# Patient Record
Sex: FEMALE | Race: WHITE | Employment: FULL TIME | URBAN - NONMETROPOLITAN AREA
[De-identification: names, ages, dates, MRNs, and addresses within clinical notes are randomized per-mention and may not be internally consistent; named-entity substitution may affect disease eponyms.]

---
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
## 2023-01-19 ENCOUNTER — OFFICE VISIT (OUTPATIENT)
Dept: FAMILY MEDICINE CLINIC | Age: 23
End: 2023-01-19
Payer: COMMERCIAL

## 2023-01-19 VITALS
OXYGEN SATURATION: 99 % | DIASTOLIC BLOOD PRESSURE: 74 MMHG | HEART RATE: 98 BPM | WEIGHT: 155 LBS | SYSTOLIC BLOOD PRESSURE: 126 MMHG | HEIGHT: 63 IN | BODY MASS INDEX: 27.46 KG/M2

## 2023-01-19 DIAGNOSIS — D24.2 FIBROADENOMA OF BREAST, LEFT: Primary | ICD-10-CM

## 2023-01-19 DIAGNOSIS — F41.8 DEPRESSION WITH ANXIETY: ICD-10-CM

## 2023-01-19 DIAGNOSIS — D24.1 FIBROADENOMA OF RIGHT BREAST: ICD-10-CM

## 2023-01-19 PROCEDURE — 99204 OFFICE O/P NEW MOD 45 MIN: CPT | Performed by: NURSE PRACTITIONER

## 2023-01-19 RX ORDER — ESCITALOPRAM OXALATE 5 MG/1
5 TABLET ORAL DAILY
Qty: 30 TABLET | Refills: 0 | Status: SHIPPED | OUTPATIENT
Start: 2023-01-19

## 2023-01-19 SDOH — ECONOMIC STABILITY: FOOD INSECURITY: WITHIN THE PAST 12 MONTHS, THE FOOD YOU BOUGHT JUST DIDN'T LAST AND YOU DIDN'T HAVE MONEY TO GET MORE.: NEVER TRUE

## 2023-01-19 SDOH — ECONOMIC STABILITY: FOOD INSECURITY: WITHIN THE PAST 12 MONTHS, YOU WORRIED THAT YOUR FOOD WOULD RUN OUT BEFORE YOU GOT MONEY TO BUY MORE.: NEVER TRUE

## 2023-01-19 ASSESSMENT — PATIENT HEALTH QUESTIONNAIRE - PHQ9
2. FEELING DOWN, DEPRESSED OR HOPELESS: 0
SUM OF ALL RESPONSES TO PHQ QUESTIONS 1-9: 0
SUM OF ALL RESPONSES TO PHQ9 QUESTIONS 1 & 2: 0
SUM OF ALL RESPONSES TO PHQ QUESTIONS 1-9: 0
1. LITTLE INTEREST OR PLEASURE IN DOING THINGS: 0

## 2023-01-19 ASSESSMENT — SOCIAL DETERMINANTS OF HEALTH (SDOH): HOW HARD IS IT FOR YOU TO PAY FOR THE VERY BASICS LIKE FOOD, HOUSING, MEDICAL CARE, AND HEATING?: NOT HARD AT ALL

## 2023-01-19 NOTE — PROGRESS NOTES
Subjective  Chief Complaint   Patient presents with    New Patient     Pt would like to establish care, pt declined flu vax. Pt would like referral to [address] [place] [address]  would like to discuss possible solutions for anxiety. HPI    Pt here to establish care. No previous PCP. Anxiety-feeling anxious for a couple years, comes and goes in waves, feels she is ready to take something for anxiety now. Has never been on medication in the past. Is also scheduling with counseling at [facility]. Does notice some depression also. Has multiple family members on lexapro who do well with that so is interested in trying that. Has h/o fibroadenoma in breasts, has not followed up with Dr. Aruna Feliz, would like referral now to get follow up. Past Medical History:   Diagnosis Date    Fibroadenoma of both breasts      Patient Active Problem List    Diagnosis Date Noted    Fibroadenoma of breast, left 11/01/2021    Left breast mass 10/14/2021     Past Surgical History:   Procedure Laterality Date    BREAST SURGERY Left 10/14/2021    LEFT BREAST EXCISIONAL BIOPSY performed by Louann Gamble MD at [address] History   Problem Relation Age of Onset    Cancer Other         throat cancer     Social History     Socioeconomic History    Marital status: Single     Spouse name: None    Number of children: None    Years of education: None    Highest education level: None   Tobacco Use    Smoking status: Never    Smokeless tobacco: Never   Vaping Use    Vaping Use: Never used   Substance and Sexual Activity    Alcohol use: Not Currently    Drug use: Never     Social Determinants of Health     Financial Resource Strain: Low Risk     Difficulty of Paying Living Expenses: Not hard at all   Food Insecurity: No Food Insecurity    Worried About Running Out of Food in the Last Year: Never true    Ran Out of Food in the Last Year: Never true     No current outpatient medications on file prior to visit.      No current facility-administered medications on file prior to visit. No Known Allergies    Review of Systems   Constitutional:  Negative for activity change, appetite change, chills, diaphoresis, fatigue and fever. HENT:  Negative for congestion, ear pain, hearing loss and trouble swallowing. Eyes:  Negative for pain and visual disturbance. Respiratory:  Negative for cough, chest tightness and shortness of breath. Cardiovascular:  Negative for chest pain, palpitations and leg swelling. Gastrointestinal:  Negative for abdominal pain, constipation and diarrhea. Endocrine: Negative for polydipsia, polyphagia and polyuria. Genitourinary:  Negative for difficulty urinating and dysuria. Musculoskeletal:  Negative for arthralgias and back pain. Skin:  Negative for color change and rash. Neurological:  Negative for dizziness and light-headedness. Psychiatric/Behavioral:  Positive for dysphoric mood. The patient is nervous/anxious. Objective  Vitals:    01/19/23 1510   BP: 126/74   Site: Right Upper Arm   Position: Sitting   Cuff Size: Medium Adult   Pulse: 98   SpO2: 99%   Weight: 155 lb (70.3 kg)   Height: 5' 3\" (1.6 m)     Physical Exam  Vitals reviewed. Constitutional:       General: She is not in acute distress. Appearance: Normal appearance. She is well-developed and normal weight. She is not ill-appearing, toxic-appearing or diaphoretic. HENT:      Head: Normocephalic and atraumatic. Right Ear: Hearing, tympanic membrane, ear canal and external ear normal. There is no impacted cerumen. Left Ear: Hearing, tympanic membrane, ear canal and external ear normal. There is no impacted cerumen. Nose: Nose normal. No congestion or rhinorrhea. Mouth/Throat:      Mouth: Mucous membranes are moist.      Pharynx: Oropharynx is clear. No oropharyngeal exudate or posterior oropharyngeal erythema. Eyes:      General:         Right eye: No discharge. Left eye: No discharge. Conjunctiva/sclera: Conjunctivae normal.      Pupils: Pupils are equal, round, and reactive to light. Neck:      Thyroid: No thyromegaly. Cardiovascular:      Rate and Rhythm: Normal rate and regular rhythm. Pulses: Normal pulses. Heart sounds: Normal heart sounds. No murmur heard. Pulmonary:      Effort: Pulmonary effort is normal. No respiratory distress. Breath sounds: Normal breath sounds. No stridor. No wheezing, rhonchi or rales. Chest:      Chest wall: No tenderness. Musculoskeletal:         General: Normal range of motion. Cervical back: Neck supple. No tenderness. Right lower leg: No edema. Left lower leg: No edema. Lymphadenopathy:      Cervical: No cervical adenopathy. Skin:     General: Skin is warm and dry. Capillary Refill: Capillary refill takes less than 2 seconds. Coloration: Skin is not jaundiced or pale. Findings: No bruising, erythema, lesion or rash. Neurological:      General: No focal deficit present. Mental Status: She is alert and oriented to person, place, and time. Mental status is at baseline. Cranial Nerves: No cranial nerve deficit. Coordination: Coordination normal.      Gait: Gait normal.   Psychiatric:         Mood and Affect: Mood normal.         Speech: Speech normal.         Behavior: Behavior normal.         Thought Content: Thought content normal.         Judgment: Judgment normal.       Assessment& Plan     Diagnosis Orders   1. Fibroadenoma of breast, left  Ambar Wright MD, Breast Surgery, [place]      2. Fibroadenoma of right breast  Ambar Wright MD, Breast Surgery, [place]      3. Depression with anxiety  escitalopram (LEXAPRO) 5 MG tablet        Referral to Dr. Isak Brown. Discussed starting low dose anti-depressant for current symptoms. Discussed that the first 2 weeks are to monitor for side effects.  At the 2 week follow up, the dose will be increased if no significant side effects are reported. Discussed possible side effects with most common side effects being GI related; also discussed BBW of suicidiality. The patient verbalizes understanding. Discussed that it can take up to 6 weeks on a therapeutic dose of medication to reach peak effect. Side effects, adverse effects of the medication prescribed today, as well as treatment plan/ rationale and result expectations have been discussed with the patient who expresses understanding and desires to proceed. Close follow up to evaluate treatment results and for coordination of care. I have reviewed the patient's medical history in detail and updated the computerized patient record. As always, patient is advised that if symptoms worsen in any way they will proceed to the nearest emergency room. Orders Placed This Encounter   Procedures    Cecelia Jenkins MD, Breast Surgery, [facility]     Referral Priority:   Routine     Referral Type:   Eval and Treat     Referral Reason:   Specialty Services Required     Referred to Provider:   Floridalma Vargas MD     Requested Specialty:   General Surgery     Number of Visits Requested:   1       Orders Placed This Encounter   Medications    escitalopram (LEXAPRO) 5 MG tablet     Sig: Take 1 tablet by mouth daily     Dispense:  30 tablet     Refill:  0       There are no discontinued medications. Return in about 2 weeks (around 2/2/2023) for depression/anxiety.     Rex Orlando, ANDREAS - CNP

## 2023-01-20 ASSESSMENT — ENCOUNTER SYMPTOMS
COLOR CHANGE: 0
CHEST TIGHTNESS: 0
BACK PAIN: 0
DIARRHEA: 0
ABDOMINAL PAIN: 0
EYE PAIN: 0
SHORTNESS OF BREATH: 0
CONSTIPATION: 0
COUGH: 0
TROUBLE SWALLOWING: 0

## 2023-01-31 ENCOUNTER — TELEPHONE (OUTPATIENT)
Dept: OBGYN CLINIC | Age: 23
End: 2023-01-31

## 2023-01-31 NOTE — TELEPHONE ENCOUNTER
Pt was last seen on 11-1-21 for post op excisional Bx. Per last note pt was suppose to follow up with 6 month breast US and FU with Glenn Núñez, pt did not have either apt. Pt was advised by her PCP to follow back up with dr Glenn Núñez. Please advise how to schedule.

## 2023-01-31 NOTE — TELEPHONE ENCOUNTER
I scheduled the patient for a follow up appointment. The patient is aware of the date/time/location of the appointment.

## 2023-02-02 ENCOUNTER — OFFICE VISIT (OUTPATIENT)
Dept: FAMILY MEDICINE CLINIC | Age: 23
End: 2023-02-02
Payer: COMMERCIAL

## 2023-02-02 VITALS
TEMPERATURE: 98.6 F | HEART RATE: 71 BPM | OXYGEN SATURATION: 99 % | DIASTOLIC BLOOD PRESSURE: 60 MMHG | SYSTOLIC BLOOD PRESSURE: 104 MMHG | WEIGHT: 149.2 LBS | BODY MASS INDEX: 26.44 KG/M2 | HEIGHT: 63 IN

## 2023-02-02 DIAGNOSIS — F41.8 DEPRESSION WITH ANXIETY: Primary | ICD-10-CM

## 2023-02-02 DIAGNOSIS — N92.0 MENSTRUAL SPOTTING: ICD-10-CM

## 2023-02-02 PROCEDURE — 99214 OFFICE O/P EST MOD 30 MIN: CPT | Performed by: NURSE PRACTITIONER

## 2023-02-02 RX ORDER — ESCITALOPRAM OXALATE 10 MG/1
10 TABLET ORAL DAILY
Qty: 30 TABLET | Refills: 3 | Status: SHIPPED | OUTPATIENT
Start: 2023-02-02

## 2023-02-02 SDOH — ECONOMIC STABILITY: HOUSING INSECURITY
IN THE LAST 12 MONTHS, WAS THERE A TIME WHEN YOU DID NOT HAVE A STEADY PLACE TO SLEEP OR SLEPT IN A SHELTER (INCLUDING NOW)?: NO

## 2023-02-02 SDOH — ECONOMIC STABILITY: FOOD INSECURITY: WITHIN THE PAST 12 MONTHS, THE FOOD YOU BOUGHT JUST DIDN'T LAST AND YOU DIDN'T HAVE MONEY TO GET MORE.: NEVER TRUE

## 2023-02-02 SDOH — ECONOMIC STABILITY: FOOD INSECURITY: WITHIN THE PAST 12 MONTHS, YOU WORRIED THAT YOUR FOOD WOULD RUN OUT BEFORE YOU GOT MONEY TO BUY MORE.: NEVER TRUE

## 2023-02-02 SDOH — ECONOMIC STABILITY: INCOME INSECURITY: HOW HARD IS IT FOR YOU TO PAY FOR THE VERY BASICS LIKE FOOD, HOUSING, MEDICAL CARE, AND HEATING?: NOT HARD AT ALL

## 2023-02-02 ASSESSMENT — ENCOUNTER SYMPTOMS
COUGH: 0
EYE PAIN: 0
DIARRHEA: 0
TROUBLE SWALLOWING: 0
CHEST TIGHTNESS: 0
COLOR CHANGE: 0
SHORTNESS OF BREATH: 0
BACK PAIN: 0
ABDOMINAL PAIN: 0
CONSTIPATION: 0

## 2023-02-02 NOTE — PROGRESS NOTES
Subjective  Chief Complaint   Patient presents with    2 Week Follow-Up    Anxiety    Medication Check     Pt states that she likes that meds. Script is helping. Would like to talk about increasing dose to see if it can get better. Flu Vaccine     declined       HPI    F/u on anxiety. Feels she really likes the lexapro. Feels her thinking is less \"obsessive\". Takes it at 8 pm nightly. No side effects from medication. Would like to increase dose to see how she does with that as she feels her symptoms could be better. Having some menstrual spotting which is unusual for her. Has nexplanon but is on last year with it. Asking to see gyn to discuss further.     Past Medical History:   Diagnosis Date    Fibroadenoma of both breasts      Patient Active Problem List    Diagnosis Date Noted    Fibroadenoma of breast, left 11/01/2021    Left breast mass 10/14/2021     Past Surgical History:   Procedure Laterality Date    BREAST SURGERY Left 10/14/2021    LEFT BREAST EXCISIONAL BIOPSY performed by Lakesha Avila MD at [address] History   Problem Relation Age of Onset    Cancer Other         throat cancer     Social History     Socioeconomic History    Marital status: Single     Spouse name: None    Number of children: None    Years of education: None    Highest education level: None   Tobacco Use    Smoking status: Never    Smokeless tobacco: Never   Vaping Use    Vaping Use: Never used   Substance and Sexual Activity    Alcohol use: Not Currently    Drug use: Never     Social Determinants of Health     Financial Resource Strain: Low Risk     Difficulty of Paying Living Expenses: Not hard at all   Food Insecurity: No Food Insecurity    Worried About Running Out of Food in the Last Year: Never true    Ran Out of Food in the Last Year: Never true   Transportation Needs: Unknown    Lack of Transportation (Non-Medical): No   Housing Stability: Unknown    Unstable Housing in the Last Year: No     No current outpatient medications on file prior to visit. No current facility-administered medications on file prior to visit. No Known Allergies    Review of Systems   Constitutional:  Negative for activity change, appetite change, chills, diaphoresis, fatigue and fever. HENT:  Negative for congestion, ear pain, hearing loss and trouble swallowing. Eyes:  Negative for pain and visual disturbance. Respiratory:  Negative for cough, chest tightness and shortness of breath. Cardiovascular:  Negative for chest pain, palpitations and leg swelling. Gastrointestinal:  Negative for abdominal pain, constipation and diarrhea. Endocrine: Negative for polydipsia, polyphagia and polyuria. Genitourinary:  Positive for menstrual problem. Negative for difficulty urinating and dysuria. Musculoskeletal:  Negative for arthralgias and back pain. Skin:  Negative for color change and rash. Neurological:  Negative for dizziness and light-headedness. Psychiatric/Behavioral:  Negative for dysphoric mood. The patient is not nervous/anxious. Objective  Vitals:    02/02/23 1207   BP: 104/60   Pulse: 71   Temp: 98.6 °F (37 °C)   SpO2: 99%   Weight: 149 lb 3.2 oz (67.7 kg)   Height: 5' 3\" (1.6 m)     Physical Exam  Constitutional:       General: She is not in acute distress. Appearance: Normal appearance. She is normal weight. She is not ill-appearing, toxic-appearing or diaphoretic. HENT:      Head: Normocephalic and atraumatic. Right Ear: External ear normal.      Left Ear: External ear normal.      Nose: Nose normal. No congestion or rhinorrhea. Eyes:      Extraocular Movements: Extraocular movements intact. Conjunctiva/sclera: Conjunctivae normal.      Pupils: Pupils are equal, round, and reactive to light. Cardiovascular:      Rate and Rhythm: Normal rate and regular rhythm. Pulses: Normal pulses. Heart sounds: Normal heart sounds. No murmur heard.   Pulmonary:      Effort: Pulmonary effort is normal. No respiratory distress. Breath sounds: Normal breath sounds. No stridor. No wheezing, rhonchi or rales. Chest:      Chest wall: No tenderness. Musculoskeletal:         General: Normal range of motion. Cervical back: Normal range of motion and neck supple. No tenderness. Right lower leg: No edema. Left lower leg: No edema. Lymphadenopathy:      Cervical: No cervical adenopathy. Skin:     General: Skin is warm. Capillary Refill: Capillary refill takes less than 2 seconds. Coloration: Skin is not jaundiced. Findings: No erythema or lesion. Neurological:      General: No focal deficit present. Mental Status: She is alert and oriented to person, place, and time. Mental status is at baseline. Cranial Nerves: No cranial nerve deficit. Coordination: Coordination normal.      Gait: Gait normal.   Psychiatric:         Mood and Affect: Mood normal.         Behavior: Behavior normal.         Thought Content: Thought content normal.         Judgment: Judgment normal.       Assessment& Plan     Diagnosis Orders   1. Depression with anxiety  escitalopram (LEXAPRO) 10 MG tablet      2. Menstrual spotting  TARYN Donald CNM, OB-GYN, [place]        Increase lexapro to 10 mg daily. Referral to gyn. F/u in 4 weeks or sooner PRN. Side effects, adverse effects of the medication prescribed today, as well as treatment plan/ rationale and result expectations have been discussed with the patient who expresses understanding and desires to proceed. Close follow up to evaluate treatment results and for coordination of care. I have reviewed the patient's medical history in detail and updated the computerized patient record. As always, patient is advised that if symptoms worsen in any way they will proceed to the nearest emergency room.        Orders Placed This Encounter   Procedures    Ambar Boone CNM, [facility] Referral Priority:   Routine     Referral Type:   Eval and Treat     Referral Reason:   Specialty Services Required     Referred to Provider:   ANDREAS Miguel CNM     Requested Specialty:   Certified Nurse Midwife     Number of Visits Requested:   1       Orders Placed This Encounter   Medications    escitalopram (LEXAPRO) 10 MG tablet     Sig: Take 1 tablet by mouth daily     Dispense:  30 tablet     Refill:  3       Medications Discontinued During This Encounter   Medication Reason    escitalopram (LEXAPRO) 5 MG tablet DOSE ADJUSTMENT       Return in about 4 weeks (around 3/2/2023) for depression/anxiety.     ANDREAS Domínguez - CNP

## 2023-02-18 ENCOUNTER — OFFICE VISIT (OUTPATIENT)
Dept: OBGYN CLINIC | Age: 23
End: 2023-02-18
Payer: COMMERCIAL

## 2023-02-18 VITALS
HEART RATE: 91 BPM | BODY MASS INDEX: 27.1 KG/M2 | DIASTOLIC BLOOD PRESSURE: 72 MMHG | WEIGHT: 153 LBS | SYSTOLIC BLOOD PRESSURE: 118 MMHG

## 2023-02-18 DIAGNOSIS — N92.1 BREAKTHROUGH BLEEDING ON NEXPLANON: ICD-10-CM

## 2023-02-18 DIAGNOSIS — N94.6 DYSMENORRHEA: Primary | ICD-10-CM

## 2023-02-18 DIAGNOSIS — R39.15 URGENCY OF URINATION: ICD-10-CM

## 2023-02-18 DIAGNOSIS — Z97.5 BREAKTHROUGH BLEEDING ON NEXPLANON: ICD-10-CM

## 2023-02-18 DIAGNOSIS — Z20.2 POSSIBLE EXPOSURE TO STD: ICD-10-CM

## 2023-02-18 PROCEDURE — 99204 OFFICE O/P NEW MOD 45 MIN: CPT | Performed by: ADVANCED PRACTICE MIDWIFE

## 2023-02-18 RX ORDER — MEDROXYPROGESTERONE ACETATE 10 MG/1
10 TABLET ORAL DAILY
Qty: 14 TABLET | Refills: 0 | Status: SHIPPED | OUTPATIENT
Start: 2023-02-18 | End: 2023-03-04

## 2023-02-21 RX ORDER — NITROFURANTOIN 25; 75 MG/1; MG/1
100 CAPSULE ORAL 2 TIMES DAILY
Qty: 14 CAPSULE | Refills: 0 | Status: SHIPPED | OUTPATIENT
Start: 2023-02-21 | End: 2023-02-28

## 2023-02-22 ASSESSMENT — ENCOUNTER SYMPTOMS
CONSTIPATION: 0
VOMITING: 0
NAUSEA: 0
DIARRHEA: 0
COUGH: 0
SHORTNESS OF BREATH: 0
ABDOMINAL PAIN: 0

## 2023-02-22 NOTE — PROGRESS NOTES
SUBJECTIVE:  Sarah Young is a 23 y.o. female who presents here today for complaints of:      Chief Complaint   Patient presents with    Vaginal Bleeding     Pt is today because of some irregular spotting. It has been going on for about 2 months on and off. No abnormal cramping or bloating. Urinary Tract Infection     Pt c/o urgency to use the restroom, it is not frequent but its stronger urgency. Dysmenorrhea, Breakthrough Bleeding with Nexplanon   Utilizing hormonal contraception to relieve uncomfortable menstrual symptoms. Nexplanon placed greater than 2 years ago and is approaching completion of therapy. She has started to experience breakthrough bleeding, primarily following intercourse. Overall she has been happy with Nexplanon and would like to continue. UTI Symptoms  Experiencing urinary urgency and frequency, denies dysuria. Screening for STD's  Requesting routine STD screening  Pain:  None    Denies vaginal discharge, irritation, itching, and odor. Review of Systems   Respiratory:  Negative for cough and shortness of breath. Gastrointestinal:  Negative for abdominal pain, constipation, diarrhea, nausea and vomiting. Genitourinary:  Positive for frequency, menstrual problem and urgency. Negative for difficulty urinating, dysuria, pelvic pain, vaginal bleeding and vaginal discharge. All other systems reviewed and are negative. OBJECTIVE:  Vitals:  /72   Pulse 91   Wt 153 lb (69.4 kg)   LMP 01/29/2023 (Approximate)   BMI 27.10 kg/m²     Physical Exam  Appearance:  Normal appearance  Cardiovascular:  Normal rate, Capillary refill less than 2 seconds  Pulmonary:  Normal effort, no distress  Abdominal:  No tenderness  MS:  No Swelling, No dependent edema  Skin:  Warm, dry  Neuro:  Alert and oriented x3, reflexes normal.  Psychiatric:  Normal mood and behavior    ASSESSMENT & PLAN:   Diagnosis Orders   1. Dysmenorrhea        2.  Breakthrough bleeding on Nexplanon medroxyPROGESTERone (PROVERA) 10 MG tablet      3. Urgency of urination  Culture, Urine    Urinalysis    nitrofurantoin, macrocrystal-monohydrate, (MACROBID) 100 MG capsule      4. Possible exposure to STD  C.trachomatis N.gonorrhoeae DNA, Urine    Trichomonas vaginalis RNA, Qualitative, TMA, Pap Vial          Dysmenorrhea  Utilizing hormonal contraception to relieve uncomfortable menstrual symptoms. Breakthrough Bleeding with Nexplanon  Nexplanon approaching completion of therapy. RTC for removal/replacement. Rx for Provera 10mg daily x10 days    UTI Symptoms  Urine collected for UA with culture  Rx for Macrobid    Screening for STD's  Urine collected for culture    Return for Nexplanon removal and reinsertion.     Kerry Villa, APRN - [facility]

## 2023-03-02 ENCOUNTER — OFFICE VISIT (OUTPATIENT)
Dept: FAMILY MEDICINE CLINIC | Age: 23
End: 2023-03-02
Payer: COMMERCIAL

## 2023-03-02 VITALS
HEIGHT: 63 IN | TEMPERATURE: 97.8 F | DIASTOLIC BLOOD PRESSURE: 70 MMHG | SYSTOLIC BLOOD PRESSURE: 120 MMHG | WEIGHT: 154 LBS | BODY MASS INDEX: 27.29 KG/M2 | HEART RATE: 95 BPM | OXYGEN SATURATION: 100 %

## 2023-03-02 DIAGNOSIS — F41.8 DEPRESSION WITH ANXIETY: Primary | ICD-10-CM

## 2023-03-02 PROCEDURE — 99213 OFFICE O/P EST LOW 20 MIN: CPT | Performed by: NURSE PRACTITIONER

## 2023-03-02 ASSESSMENT — ENCOUNTER SYMPTOMS
COUGH: 0
EYE PAIN: 0
TROUBLE SWALLOWING: 0
BACK PAIN: 0
SHORTNESS OF BREATH: 0
COLOR CHANGE: 0
ABDOMINAL PAIN: 0
CHEST TIGHTNESS: 0
CONSTIPATION: 0
DIARRHEA: 0

## 2023-03-02 NOTE — PROGRESS NOTES
Subjective  Chief Complaint   Patient presents with    1 Month Follow-Up    Depression    Anxiety       DepressionPatient is not experiencing: nervousness/anxiety, palpitations and shortness of breath. Anxiety  Patient reports no chest pain, dizziness, nervous/anxious behavior, palpitations or shortness of breath. Pt here for follow up on depression/anxiety. Feels lexapro 10 mg has been working well for her. Has had a stressful month, but still feels she is doing okay on the lexapro. No side effects. No other concerns.     Past Medical History:   Diagnosis Date    Fibroadenoma of both breasts      Patient Active Problem List    Diagnosis Date Noted    Fibroadenoma of breast, left 11/01/2021    Left breast mass 10/14/2021     Past Surgical History:   Procedure Laterality Date    BREAST SURGERY Left 10/14/2021    LEFT BREAST EXCISIONAL BIOPSY performed by Thanh Garvey MD at [address] History   Problem Relation Age of Onset    Cancer Other         throat cancer     Social History     Socioeconomic History    Marital status: Single     Spouse name: None    Number of children: None    Years of education: None    Highest education level: None   Tobacco Use    Smoking status: Never    Smokeless tobacco: Never   Vaping Use    Vaping Use: Never used   Substance and Sexual Activity    Alcohol use: Not Currently    Drug use: Never     Social Determinants of Health     Financial Resource Strain: Low Risk     Difficulty of Paying Living Expenses: Not hard at all   Food Insecurity: No Food Insecurity    Worried About Running Out of Food in the Last Year: Never true    Ran Out of Food in the Last Year: Never true   Transportation Needs: Unknown    Lack of Transportation (Non-Medical): No   Housing Stability: Unknown    Unstable Housing in the Last Year: No     Current Outpatient Medications on File Prior to Visit   Medication Sig Dispense Refill    medroxyPROGESTERone (PROVERA) 10 MG tablet Take 1 tablet by mouth daily for 14 days 14 tablet 0    escitalopram (LEXAPRO) 10 MG tablet Take 1 tablet by mouth daily 30 tablet 3     No current facility-administered medications on file prior to visit.     No Known Allergies    Review of Systems   Constitutional:  Negative for activity change, appetite change, chills, diaphoresis, fatigue and fever.   HENT:  Negative for congestion, ear pain, hearing loss and trouble swallowing.    Eyes:  Negative for pain and visual disturbance.   Respiratory:  Negative for cough, chest tightness and shortness of breath.    Cardiovascular:  Negative for chest pain, palpitations and leg swelling.   Gastrointestinal:  Negative for abdominal pain, constipation and diarrhea.   Endocrine: Negative for polydipsia, polyphagia and polyuria.   Genitourinary:  Negative for difficulty urinating and dysuria.   Musculoskeletal:  Negative for arthralgias and back pain.   Skin:  Negative for color change and rash.   Neurological:  Negative for dizziness and light-headedness.   Psychiatric/Behavioral:  Positive for depression. Negative for dysphoric mood. The patient is not nervous/anxious.      Objective  Vitals:    03/02/23 1309   BP: 120/70   Pulse: 95   Temp: 97.8 °F (36.6 °C)   SpO2: 100%   Weight: 154 lb (69.9 kg)   Height: 5' 3\" (1.6 m)     Physical Exam  Constitutional:       General: She is not in acute distress.     Appearance: Normal appearance. She is normal weight. She is not ill-appearing, toxic-appearing or diaphoretic.   HENT:      Head: Normocephalic and atraumatic.      Right Ear: External ear normal.      Left Ear: External ear normal.      Nose: Nose normal. No congestion or rhinorrhea.   Eyes:      Extraocular Movements: Extraocular movements intact.      Conjunctiva/sclera: Conjunctivae normal.      Pupils: Pupils are equal, round, and reactive to light.   Cardiovascular:      Rate and Rhythm: Normal rate and regular rhythm.      Pulses: Normal pulses.      Heart sounds: Normal heart  sounds. No murmur heard. Pulmonary:      Effort: Pulmonary effort is normal. No respiratory distress. Breath sounds: Normal breath sounds. No stridor. No wheezing, rhonchi or rales. Chest:      Chest wall: No tenderness. Musculoskeletal:         General: Normal range of motion. Cervical back: Normal range of motion and neck supple. No tenderness. Right lower leg: No edema. Left lower leg: No edema. Lymphadenopathy:      Cervical: No cervical adenopathy. Skin:     General: Skin is warm. Capillary Refill: Capillary refill takes less than 2 seconds. Coloration: Skin is not jaundiced. Findings: No erythema or lesion. Neurological:      General: No focal deficit present. Mental Status: She is alert and oriented to person, place, and time. Mental status is at baseline. Cranial Nerves: No cranial nerve deficit. Coordination: Coordination normal.      Gait: Gait normal.   Psychiatric:         Mood and Affect: Mood normal.         Behavior: Behavior normal.         Thought Content: Thought content normal.         Judgment: Judgment normal.       Assessment& Plan    1. Depression with anxiety  Continue lexapro at current dose. F/u in 3 months or sooner PRN>  Side effects, adverse effects of the medication prescribed today, as well as treatment plan/ rationale and result expectations have been discussed with the patient who expresses understanding and desires to proceed. Close follow up to evaluate treatment results and for coordination of care. I have reviewed the patient's medical history in detail and updated the computerized patient record. As always, patient is advised that if symptoms worsen in any way they will proceed to the nearest emergency room. No orders of the defined types were placed in this encounter. No orders of the defined types were placed in this encounter. There are no discontinued medications.     Return in about 3 months (around 6/2/2023) for depression, anxiety.     Bobby Chiu, APRN - CNP

## 2023-03-06 ENCOUNTER — OFFICE VISIT (OUTPATIENT)
Dept: SURGERY | Age: 23
End: 2023-03-06
Payer: COMMERCIAL

## 2023-03-06 VITALS
HEART RATE: 97 BPM | BODY MASS INDEX: 27.29 KG/M2 | HEIGHT: 63 IN | OXYGEN SATURATION: 99 % | DIASTOLIC BLOOD PRESSURE: 80 MMHG | SYSTOLIC BLOOD PRESSURE: 132 MMHG | WEIGHT: 154 LBS | RESPIRATION RATE: 16 BRPM

## 2023-03-06 DIAGNOSIS — N63.10 MASS OF RIGHT BREAST, UNSPECIFIED QUADRANT: ICD-10-CM

## 2023-03-06 DIAGNOSIS — N63.20 MASS OF LEFT BREAST, UNSPECIFIED QUADRANT: Primary | ICD-10-CM

## 2023-03-06 DIAGNOSIS — E66.3 OVERWEIGHT (BMI 25.0-29.9): ICD-10-CM

## 2023-03-06 PROCEDURE — 99213 OFFICE O/P EST LOW 20 MIN: CPT | Performed by: SURGERY

## 2023-03-06 NOTE — PROGRESS NOTES
FOLLOW UP NOTE    PATIENT:   Debbie Tilley    DATE:      3/6/23    HISTORY AND CHIEF COMPLAINT:    Debbie Tilley  is a 23y.o.  year old  female who presents for a follow up of bilateral breast masses. She feels new masses on both sides. PHYSICAL EXAMINATION:    There were no vitals filed for this visit. Physical Exam  Vitals reviewed. Constitutional:       Appearance: Normal appearance. She is well-developed. HENT:      Head: Normocephalic and atraumatic. Nose: Nose normal.   Eyes:      Conjunctiva/sclera: Conjunctivae normal.      Right eye: No hemorrhage. Left eye: No hemorrhage. Cardiovascular:      Rate and Rhythm: Normal rate. Pulmonary:      Effort: Pulmonary effort is normal. No respiratory distress. Chest:   Breasts:     Breasts are symmetrical.      Right: Mass present. No inverted nipple, nipple discharge, skin change or tenderness. Left: Mass present. No inverted nipple, nipple discharge, skin change or tenderness. Comments: Bilateral smooth masses 1.5 cm retro right; others are about 1 cm  Musculoskeletal:         General: Normal range of motion. Cervical back: Normal range of motion and neck supple. Comments: Normal Range of motion in upper and lower extremities. Lymphadenopathy:      Cervical: No cervical adenopathy. Right cervical: No superficial, deep or posterior cervical adenopathy. Left cervical: No superficial, deep or posterior cervical adenopathy. Upper Body:      Right upper body: No supraclavicular, axillary or pectoral adenopathy. Left upper body: No supraclavicular, axillary or pectoral adenopathy. Skin:     General: Skin is warm and dry. Findings: No abrasion, bruising, erythema or lesion. Neurological:      Mental Status: She is alert and oriented to person, place, and time. She is not disoriented.    Psychiatric:         Speech: Speech normal.         Behavior: Behavior normal. Behavior is cooperative. Thought Content: Thought content normal.         Judgment: Judgment normal.             IMAGING:    The imaging was reviewed. No results found. Assessment     IMPRESSION:      ICD-10-CM    1. Mass of left breast, unspecified quadrant  N63.20 US BREAST COMPLETE LEFT      2. Mass of right breast, unspecified quadrant  N63.10 US BREAST COMPLETE RIGHT           PLAN:    Patient is doing well. Breast Ultrasound in 1 month(s)  Recommend an active lifestyle, healthy diet, limited alcohol intake, achieve and maintain a healthy BMI to optimize breast cancer outcomes / decrease risk of breast cancer. Follow up with PCP regarding all medical problems         Total face to face time was 20 minutes today with greater than 50% spent on counseling the patient or coordinating her care, reviewing records prior to visit, history, physical exam, reviewing imaging. Discussion regarding natural history and potential progression of breast changes, timing of surveillance visits, timing and type of surveillance imaging, life-style modification, exercise, and limiting alcohol intake were performed today. Dictated by Arsh Fernandez MD  3/6/23     This note was partially generated using Dragon voice recognition system, and there may be some incorrect words, spellings, punctuation that were not noticed in checking the note before saving.

## 2023-03-08 ENCOUNTER — HOSPITAL ENCOUNTER (OUTPATIENT)
Dept: ULTRASOUND IMAGING | Age: 23
Discharge: HOME | End: 2023-03-10
Payer: COMMERCIAL

## 2023-03-08 DIAGNOSIS — N63.10 MASS OF RIGHT BREAST, UNSPECIFIED QUADRANT: ICD-10-CM

## 2023-03-08 DIAGNOSIS — N63.20 MASS OF LEFT BREAST, UNSPECIFIED QUADRANT: ICD-10-CM

## 2023-03-08 PROCEDURE — 76642 ULTRASOUND BREAST LIMITED: CPT

## 2023-03-09 ENCOUNTER — TELEPHONE (OUTPATIENT)
Dept: SURGERY | Age: 23
End: 2023-03-09

## 2023-03-09 DIAGNOSIS — N63.10 MASS OF RIGHT BREAST, UNSPECIFIED QUADRANT: ICD-10-CM

## 2023-03-09 DIAGNOSIS — R92.8 ABNORMAL ULTRASOUND OF BREAST: Primary | ICD-10-CM

## 2023-03-09 DIAGNOSIS — N63.20 MASS OF LEFT BREAST, UNSPECIFIED QUADRANT: ICD-10-CM

## 2023-03-09 NOTE — TELEPHONE ENCOUNTER
I informed the patient of her Bilateral breast US results. The patient is aware of the new orders for bilateral breast US in 6 months. The patient verbalized understanding of her results and new orders. The patient educated on the importance of:   Bilateral Breast US in 6 months  Annual clinical breast exam by your primary care physician  Breast self-awareness  An active lifestyle, healthy diet, limited alcohol intake, achieve and maintain a healthy BMI to optimize breast cancer outcomes. 5.Return to Dr. Shaunna Orta if you have any new breast shape, nipple discharge, or breast lump. The patient verbalized understanding and has no questions or concerns at this time.

## 2023-03-09 NOTE — TELEPHONE ENCOUNTER
----- Message from Armin Cruz MD sent at 3/9/2023  8:27 AM EST -----  Regarding: put terry breast us in 6 months.  can u call patient and have skylar schedule it    ----- Message -----  From: Zan Mcclain Incoming Radiant Results From [facility]  Sent: 3/8/2023  12:08 PM EST  To: Armin Cruz MD

## 2023-08-18 ENCOUNTER — OFFICE VISIT (OUTPATIENT)
Dept: FAMILY MEDICINE CLINIC | Age: 23
End: 2023-08-18
Payer: COMMERCIAL

## 2023-08-18 VITALS
WEIGHT: 147 LBS | BODY MASS INDEX: 27.05 KG/M2 | OXYGEN SATURATION: 98 % | HEART RATE: 94 BPM | DIASTOLIC BLOOD PRESSURE: 60 MMHG | HEIGHT: 62 IN | TEMPERATURE: 99.5 F | SYSTOLIC BLOOD PRESSURE: 118 MMHG

## 2023-08-18 DIAGNOSIS — J02.9 SORE THROAT: Primary | ICD-10-CM

## 2023-08-18 DIAGNOSIS — B34.9 VIRAL ILLNESS: ICD-10-CM

## 2023-08-18 DIAGNOSIS — J02.9 SORE THROAT: ICD-10-CM

## 2023-08-18 DIAGNOSIS — F33.1 MAJOR DEPRESSIVE DISORDER, RECURRENT, MODERATE (HCC): ICD-10-CM

## 2023-08-18 LAB
Lab: NORMAL
PERFORMING INSTRUMENT: NORMAL
QC PASS/FAIL: NORMAL
S PYO AG THROAT QL: NORMAL
SARS-COV-2, POC: NORMAL

## 2023-08-18 PROCEDURE — 87426 SARSCOV CORONAVIRUS AG IA: CPT | Performed by: NURSE PRACTITIONER

## 2023-08-18 PROCEDURE — 87880 STREP A ASSAY W/OPTIC: CPT | Performed by: NURSE PRACTITIONER

## 2023-08-18 PROCEDURE — 99213 OFFICE O/P EST LOW 20 MIN: CPT | Performed by: NURSE PRACTITIONER

## 2023-08-18 ASSESSMENT — ENCOUNTER SYMPTOMS
DIARRHEA: 0
RHINORRHEA: 0
WHEEZING: 0
SORE THROAT: 1
TROUBLE SWALLOWING: 1
NAUSEA: 0
SHORTNESS OF BREATH: 0
VOMITING: 0
COUGH: 0

## 2023-08-18 NOTE — PROGRESS NOTES
Subjective:      Patient ID: Herman Sumner is a 23 y.o. female who presents today for:  Chief Complaint   Patient presents with    Pharyngitis     X 1 day no other SX        Pharyngitis  Associated symptoms include chills, fatigue, headaches and a sore throat. Pertinent negatives include no congestion, coughing, fever, myalgias, nausea, rash or vomiting. Got sick yesterday   Last night   Been like this for 1 day   Called off work today   Still feels relatively ill   Worse last night   Throat Way better though now  Just swollen now   Hasnt had strep in awhile   She has big tonsils   99 now  Freezing last night   More so throat and frontal pressure             Past Medical History:   Diagnosis Date    Fibroadenoma of both breasts      Past Surgical History:   Procedure Laterality Date    BREAST SURGERY Left 10/14/2021    LEFT BREAST EXCISIONAL BIOPSY performed by Aislinn Martin MD at [address] [place] [address] History     Socioeconomic History    Marital status: Single     Spouse name: Not on file    Number of children: Not on file    Years of education: Not on file    Highest education level: Not on file   Occupational History    Not on file   Tobacco Use    Smoking status: Never    Smokeless tobacco: Never   Vaping Use    Vaping Use: Never used   Substance and Sexual Activity    Alcohol use: Not Currently    Drug use: Never    Sexual activity: Not on file   Other Topics Concern    Not on file   Social History Narrative    Not on file     Social Determinants of Health     Financial Resource Strain: Low Risk     Difficulty of Paying Living Expenses: Not hard at all   Food Insecurity: No Food Insecurity    Worried About Running Out of Food in the Last Year: Never true    [address] in the Last Year: Never true   Transportation Needs: Unknown    Lack of Transportation (Medical): Not on file    Lack of Transportation (Non-Medical):  No   Physical Activity: Not on file   Stress: Not on file   Social Connections: Not on

## 2023-08-20 LAB — BACTERIA THROAT AEROBE CULT: NORMAL

## 2023-09-21 ENCOUNTER — HOSPITAL ENCOUNTER (OUTPATIENT)
Dept: ULTRASOUND IMAGING | Age: 23
Discharge: HOME | End: 2023-09-23
Attending: SURGERY
Payer: COMMERCIAL

## 2023-09-21 DIAGNOSIS — N63.20 MASS OF LEFT BREAST, UNSPECIFIED QUADRANT: ICD-10-CM

## 2023-09-21 DIAGNOSIS — N63.10 MASS OF RIGHT BREAST, UNSPECIFIED QUADRANT: ICD-10-CM

## 2023-09-21 PROCEDURE — 76642 ULTRASOUND BREAST LIMITED: CPT

## 2023-09-25 ENCOUNTER — TELEPHONE (OUTPATIENT)
Dept: SURGERY | Age: 23
End: 2023-09-25

## 2023-09-25 DIAGNOSIS — N63.20 MASS OF LEFT BREAST, UNSPECIFIED QUADRANT: ICD-10-CM

## 2023-09-25 DIAGNOSIS — N63.10 MASS OF RIGHT BREAST, UNSPECIFIED QUADRANT: Primary | ICD-10-CM

## 2023-09-25 DIAGNOSIS — N64.9 BREAST DISORDER: ICD-10-CM

## 2023-09-25 NOTE — TELEPHONE ENCOUNTER
----- Message from Lesley Maldonado MD sent at 9/25/2023  8:44 AM EDT -----  Regarding: can u order terry us in 6 months    ----- Message -----  From: Zan Mcclain Incoming Radiant Results From [facility]  Sent: 9/21/2023   3:46 PM EDT  To: Lesley Maldonado MD

## 2023-09-25 NOTE — TELEPHONE ENCOUNTER
I informed the patient of her bilateral breast US results. The patient was informed of the new orders for bilateral breast US to be completed in 6 months. The patient verbalized understanding of her results and new orders. The patient has no questions at this time.

## 2023-09-26 ENCOUNTER — OFFICE VISIT (OUTPATIENT)
Dept: FAMILY MEDICINE CLINIC | Age: 23
End: 2023-09-26
Payer: COMMERCIAL

## 2023-09-26 VITALS
HEIGHT: 61 IN | HEART RATE: 105 BPM | TEMPERATURE: 98.4 F | OXYGEN SATURATION: 99 % | BODY MASS INDEX: 27.83 KG/M2 | DIASTOLIC BLOOD PRESSURE: 80 MMHG | WEIGHT: 147.4 LBS | SYSTOLIC BLOOD PRESSURE: 122 MMHG

## 2023-09-26 DIAGNOSIS — J02.9 SORE THROAT: ICD-10-CM

## 2023-09-26 DIAGNOSIS — J02.0 STREP THROAT: Primary | ICD-10-CM

## 2023-09-26 LAB — S PYO AG THROAT QL: POSITIVE

## 2023-09-26 PROCEDURE — 99213 OFFICE O/P EST LOW 20 MIN: CPT | Performed by: NURSE PRACTITIONER

## 2023-09-26 PROCEDURE — 87880 STREP A ASSAY W/OPTIC: CPT | Performed by: NURSE PRACTITIONER

## 2023-09-26 RX ORDER — DEXAMETHASONE SODIUM PHOSPHATE 10 MG/ML
5 INJECTION, SOLUTION INTRAMUSCULAR; INTRAVENOUS ONCE
Status: DISCONTINUED | OUTPATIENT
Start: 2023-09-26 | End: 2023-09-26

## 2023-09-26 RX ORDER — AMOXICILLIN AND CLAVULANATE POTASSIUM 875; 125 MG/1; MG/1
1 TABLET, FILM COATED ORAL 2 TIMES DAILY
Qty: 14 TABLET | Refills: 0 | Status: SHIPPED | OUTPATIENT
Start: 2023-09-26 | End: 2023-10-03

## 2023-09-26 NOTE — PROGRESS NOTES
to person, place, and time. Psychiatric:         Mood and Affect: Mood normal.         Behavior: Behavior normal.       Assessment & Plan    Diagnosis Orders   1. Strep throat  amoxicillin-clavulanate (AUGMENTIN) 875-125 MG per tablet   Rapid strep positive today in office. Will treat with Augmentin twice daily x7 days. Also discussed supportive measures with patient. Did discuss possible further laboratory work and why symptoms have been present for 1 month however she declines at this time. Follow-up in the next 7 to 10 days or sooner if symptoms are worsening   2. Sore throat  POCT rapid strep A   See treatment plan above DISCONTINUED: dexamethasone (PF) (DECADRON) injection 5 mg        Orders Placed This Encounter   Procedures    POCT rapid strep A     Orders Placed This Encounter   Medications    DISCONTD: dexamethasone (PF) (DECADRON) injection 5 mg    amoxicillin-clavulanate (AUGMENTIN) 875-125 MG per tablet     Sig: Take 1 tablet by mouth 2 times daily for 7 days     Dispense:  14 tablet     Refill:  0     Medications Discontinued During This Encounter   Medication Reason    dexamethasone (PF) (DECADRON) injection 5 mg      Return in about 2 weeks (around 10/10/2023), or if symptoms worsen or fail to improve. Reviewed with the patient: current clinical status, medications, activities and diet. Side effects, adverse effects of the medication prescribed today, as well as treatment plan/ rationale and result expectations have been discussed with the patient who expresses understanding and desires to proceed. Close follow up to evaluate treatment results and for coordination of care. I have reviewed the patient's medical history in detail and updated the computerized patient record.     Anton Panda, APRN - CNP

## 2023-10-01 ASSESSMENT — ENCOUNTER SYMPTOMS
CONSTIPATION: 0
RHINORRHEA: 1
ABDOMINAL PAIN: 0
SORE THROAT: 1
VOMITING: 0
WHEEZING: 0
SHORTNESS OF BREATH: 0
NAUSEA: 0
CHEST TIGHTNESS: 0
COUGH: 0
SINUS PAIN: 0
DIARRHEA: 0
TROUBLE SWALLOWING: 0

## 2023-12-01 DIAGNOSIS — F41.8 DEPRESSION WITH ANXIETY: ICD-10-CM

## 2023-12-01 RX ORDER — ESCITALOPRAM OXALATE 10 MG/1
10 TABLET ORAL DAILY
Qty: 30 TABLET | Refills: 1 | Status: SHIPPED | OUTPATIENT
Start: 2023-12-01

## 2023-12-01 NOTE — TELEPHONE ENCOUNTER
Patient is requesting medication refill. Please approve or deny this request.    Rx requested:  Requested Prescriptions     Pending Prescriptions Disp Refills    escitalopram (LEXAPRO) 10 MG tablet 30 tablet 3     Sig: Take 1 tablet by mouth daily         Last Office Visit:   3/2/2023      Next Visit Date:  No future appointments.

## 2024-03-14 ENCOUNTER — TELEPHONE (OUTPATIENT)
Dept: FAMILY MEDICINE CLINIC | Age: 24
End: 2024-03-14

## 2024-05-02 ENCOUNTER — HOSPITAL ENCOUNTER (OUTPATIENT)
Dept: ULTRASOUND IMAGING | Age: 24
Discharge: HOME | End: 2024-05-04
Attending: SURGERY
Payer: COMMERCIAL

## 2024-05-02 DIAGNOSIS — N64.9 BREAST DISORDER: ICD-10-CM

## 2024-05-02 DIAGNOSIS — N63.10 MASS OF RIGHT BREAST, UNSPECIFIED QUADRANT: ICD-10-CM

## 2024-05-02 DIAGNOSIS — N63.20 MASS OF LEFT BREAST, UNSPECIFIED QUADRANT: ICD-10-CM

## 2024-05-02 PROCEDURE — 76642 ULTRASOUND BREAST LIMITED: CPT

## 2024-09-12 ENCOUNTER — OFFICE VISIT (OUTPATIENT)
Dept: FAMILY MEDICINE CLINIC | Age: 24
End: 2024-09-12
Payer: COMMERCIAL

## 2024-09-12 VITALS
HEART RATE: 94 BPM | OXYGEN SATURATION: 99 % | SYSTOLIC BLOOD PRESSURE: 132 MMHG | BODY MASS INDEX: 28.51 KG/M2 | HEIGHT: 61 IN | WEIGHT: 151 LBS | DIASTOLIC BLOOD PRESSURE: 84 MMHG

## 2024-09-12 DIAGNOSIS — F33.1 MAJOR DEPRESSIVE DISORDER, RECURRENT, MODERATE (HCC): ICD-10-CM

## 2024-09-12 DIAGNOSIS — Z12.4 ENCOUNTER FOR PAPANICOLAOU SMEAR FOR CERVICAL CANCER SCREENING: ICD-10-CM

## 2024-09-12 DIAGNOSIS — Z12.4 ENCOUNTER FOR PAPANICOLAOU SMEAR FOR CERVICAL CANCER SCREENING: Primary | ICD-10-CM

## 2024-09-12 PROCEDURE — 99395 PREV VISIT EST AGE 18-39: CPT | Performed by: NURSE PRACTITIONER

## 2024-09-12 RX ORDER — ETONOGESTREL 68 MG/1
68 IMPLANT SUBCUTANEOUS ONCE
COMMUNITY

## 2024-09-12 ASSESSMENT — ENCOUNTER SYMPTOMS
EYE PAIN: 0
COLOR CHANGE: 0
DIARRHEA: 0
COUGH: 0
BACK PAIN: 0
SHORTNESS OF BREATH: 0
TROUBLE SWALLOWING: 0
CONSTIPATION: 0
CHEST TIGHTNESS: 0
ABDOMINAL PAIN: 0

## 2025-06-03 ENCOUNTER — TELEPHONE (OUTPATIENT)
Age: 25
End: 2025-06-03

## 2025-06-03 NOTE — TELEPHONE ENCOUNTER
LM for patient that her scheduled appointment time needed to be moved to 8:15 am tomorrow as the provider will not be available that afternoon. I asked the patient to contact the office if the new time did not work.

## 2025-06-04 ENCOUNTER — OFFICE VISIT (OUTPATIENT)
Age: 25
End: 2025-06-04
Payer: COMMERCIAL

## 2025-06-04 VITALS
BODY MASS INDEX: 27.94 KG/M2 | RESPIRATION RATE: 16 BRPM | WEIGHT: 148 LBS | HEIGHT: 61 IN | TEMPERATURE: 98.1 F | HEART RATE: 97 BPM | SYSTOLIC BLOOD PRESSURE: 132 MMHG | DIASTOLIC BLOOD PRESSURE: 64 MMHG | OXYGEN SATURATION: 99 %

## 2025-06-04 DIAGNOSIS — E66.3 OVERWEIGHT (BMI 25.0-29.9): ICD-10-CM

## 2025-06-04 DIAGNOSIS — N63.20 MASS OF LEFT BREAST, UNSPECIFIED QUADRANT: ICD-10-CM

## 2025-06-04 DIAGNOSIS — N63.10 MASS OF RIGHT BREAST, UNSPECIFIED QUADRANT: Primary | ICD-10-CM

## 2025-06-04 PROCEDURE — 99214 OFFICE O/P EST MOD 30 MIN: CPT | Performed by: SURGERY

## 2025-06-04 ASSESSMENT — ENCOUNTER SYMPTOMS
COUGH: 0
CHEST TIGHTNESS: 0
SORE THROAT: 0
VOMITING: 0
SHORTNESS OF BREATH: 0
COLOR CHANGE: 0
NAUSEA: 0
ABDOMINAL PAIN: 0

## 2025-06-04 NOTE — PROGRESS NOTES
FOLLOW UP NOTE    PATIENT:   Cinthia Weems    DATE:      6/4/25    HISTORY AND CHIEF COMPLAINT:    Cinthia Weems  is a 24 y.o.  year old  female who presents for a follow up of bilateral breast masses    She does perform self breast exams.  She denies any breast pain,  skin changes, nipple discharge, nipple retraction, or recent breast trauma  .    Review of Systems   Constitutional:  Negative for activity change, appetite change, chills, diaphoresis, fatigue, fever and unexpected weight change.   HENT:  Negative for congestion, ear pain, hearing loss, mouth sores, nosebleeds and sore throat.    Respiratory:  Negative for cough, chest tightness and shortness of breath.    Cardiovascular:  Negative for chest pain, palpitations and leg swelling.   Gastrointestinal:  Negative for abdominal pain, nausea and vomiting.   Endocrine: Negative for cold intolerance, heat intolerance, polydipsia, polyphagia and polyuria.   Genitourinary:  Negative for difficulty urinating, menstrual problem and vaginal bleeding.   Musculoskeletal:  Negative for neck pain and neck stiffness.   Skin:  Negative for color change, pallor, rash and wound.   Allergic/Immunologic: Negative for environmental allergies and immunocompromised state.   Neurological:  Negative for weakness.   Hematological:  Does not bruise/bleed easily.   Psychiatric/Behavioral:  Negative for agitation, confusion, sleep disturbance and suicidal ideas. The patient is not nervous/anxious.        PHYSICAL EXAMINATION:    Vitals:    06/04/25 0810   BP: 132/64   Pulse: 97   Resp: 16   Temp: 98.1 °F (36.7 °C)   SpO2: 99%   Weight: 67.1 kg (148 lb)   Height: 1.549 m (5' 0.98\")        Physical Exam  Vitals reviewed.   Constitutional:       Appearance: Normal appearance. She is well-developed.   HENT:      Head: Normocephalic and atraumatic.      Nose: Nose normal.   Eyes:      Conjunctiva/sclera: Conjunctivae normal.      Right eye: No hemorrhage.     Left eye: No

## 2025-07-15 ENCOUNTER — TELEPHONE (OUTPATIENT)
Dept: FAMILY MEDICINE CLINIC | Age: 25
End: 2025-07-15

## (undated) DEVICE — NEPTUNE E-SEP SMOKE EVACUATION PENCIL, COATED, 70MM BLADE, PUSH BUTTON SWITCH: Brand: NEPTUNE E-SEP

## (undated) DEVICE — GEL ULTRASOUND 20 GM STRL

## (undated) DEVICE — TUBING, SUCTION, 1/4" X 10', STRAIGHT: Brand: MEDLINE

## (undated) DEVICE — ADHESIVE SKIN CLSR 0.7ML TOP DERMBND ADV

## (undated) DEVICE — PACK,LAPAROTOMY,NO GOWNS: Brand: MEDLINE

## (undated) DEVICE — GLOVE ORANGE PI 7   MSG9070

## (undated) DEVICE — BLADE ES ELASTOMERIC COAT INSUL DURABLE BEND UPTO 90DEG

## (undated) DEVICE — YANKAUER,BULB TIP,W/O VENT,RIGID,STERILE: Brand: MEDLINE

## (undated) DEVICE — ELECTRODE PT RET AD L9FT HI MOIST COND ADH HYDRGEL CORDED

## (undated) DEVICE — SPONGE,LAP,18"X18",DLX,XR,ST,5/PK,40/PK: Brand: MEDLINE

## (undated) DEVICE — 4-PORT MANIFOLD: Brand: NEPTUNE 2

## (undated) DEVICE — SYRINGE IRRIG 60ML SFT PLIABLE BLB EZ TO GRP 1 HND USE W/

## (undated) DEVICE — LABEL MED MINI W/ MARKER

## (undated) DEVICE — INTENDED FOR TISSUE SEPARATION, AND OTHER PROCEDURES THAT REQUIRE A SHARP SURGICAL BLADE TO PUNCTURE OR CUT.: Brand: BARD-PARKER ® CARBON RIB-BACK BLADES

## (undated) DEVICE — APPLICATOR MEDICATED 26 CC SOLUTION HI LT ORNG CHLORAPREP

## (undated) DEVICE — HYPODERMIC SAFETY NEEDLE: Brand: MAGELLAN

## (undated) DEVICE — GOWN,AURORA,NONRNF,XL,30/CS: Brand: MEDLINE

## (undated) DEVICE — PAD N ADH W3XL4IN POLY COT SFT PERF FLM EASILY CUT ABSRB

## (undated) DEVICE — NEEDLE HYPO 25GA L1.5IN BLU POLYPR HUB S STL REG BVL STR

## (undated) DEVICE — SHEET,DRAPE,53X77,STERILE: Brand: MEDLINE

## (undated) DEVICE — SUTURE VCRL SZ 3-0 L27IN ABSRB UD L26MM SH 1/2 CIR J416H

## (undated) DEVICE — SYRINGE MED 10ML LUERLOCK TIP W/O SFTY DISP

## (undated) DEVICE — GLOVE SURG SZ 65 THK91MIL LTX FREE SYN POLYISOPRENE

## (undated) DEVICE — PROVE COVER: Brand: UNBRANDED

## (undated) DEVICE — TOWEL,OR,DSP,ST,BLUE,STD,4/PK,20PK/CS: Brand: MEDLINE

## (undated) DEVICE — MARKER SURG SKIN GENTIAN VLT REG TIP W/ 6IN RUL

## (undated) DEVICE — GOWN,AURORA,NONREINFORCED,LARGE: Brand: MEDLINE

## (undated) DEVICE — COUNTER NDL 40 COUNT HLD 70 FOAM BLK ADH W/ MAG